# Patient Record
Sex: MALE | Race: WHITE | NOT HISPANIC OR LATINO | URBAN - METROPOLITAN AREA
[De-identification: names, ages, dates, MRNs, and addresses within clinical notes are randomized per-mention and may not be internally consistent; named-entity substitution may affect disease eponyms.]

---

## 2020-07-25 ENCOUNTER — TELEPHONE ENCOUNTER (OUTPATIENT)
Dept: URBAN - METROPOLITAN AREA CLINIC 13 | Facility: CLINIC | Age: 55
End: 2020-07-25

## 2020-07-25 RX ORDER — POLYETHYLENE GLYCOL 3350, SODIUM CHLORIDE, SODIUM BICARBONATE AND POTASSIUM CHLORIDE WITH LEMON FLAVOR 420; 11.2; 5.72; 1.48 G/4L; G/4L; G/4L; G/4L
TAKE 1/2 GALLON AT 5:00 PM DAY BEFORE PROCEDURE, TAKE SECOND 1/2 OF GALLON 6 HRS PRIOR TO PROCEDURE POWDER, FOR SOLUTION ORAL
Qty: 1 | Refills: 0 | OUTPATIENT
Start: 2017-03-02 | End: 2017-04-14

## 2020-07-26 ENCOUNTER — TELEPHONE ENCOUNTER (OUTPATIENT)
Dept: URBAN - METROPOLITAN AREA CLINIC 13 | Facility: CLINIC | Age: 55
End: 2020-07-26

## 2020-07-26 RX ORDER — CETIRIZINE HYDROCHLORIDE 10 MG/1
TAKE 1 CAPSULE DAILY CAPSULE, LIQUID FILLED ORAL
Refills: 0 | Status: ACTIVE | COMMUNITY
Start: 2017-03-02

## 2020-12-11 ENCOUNTER — OFFICE VISIT (OUTPATIENT)
Dept: URBAN - METROPOLITAN AREA CLINIC 113 | Facility: CLINIC | Age: 55
End: 2020-12-11

## 2020-12-14 ENCOUNTER — OFFICE VISIT (OUTPATIENT)
Dept: URBAN - METROPOLITAN AREA CLINIC 113 | Facility: CLINIC | Age: 55
End: 2020-12-14
Payer: COMMERCIAL

## 2020-12-14 VITALS
BODY MASS INDEX: 25.8 KG/M2 | HEART RATE: 94 BPM | WEIGHT: 201 LBS | DIASTOLIC BLOOD PRESSURE: 92 MMHG | SYSTOLIC BLOOD PRESSURE: 155 MMHG | TEMPERATURE: 96.9 F | HEIGHT: 74 IN | RESPIRATION RATE: 18 BRPM

## 2020-12-14 DIAGNOSIS — K64.8 INTERNAL HEMORRHOIDS: ICD-10-CM

## 2020-12-14 DIAGNOSIS — R68.89 THROAT CLEARING: ICD-10-CM

## 2020-12-14 PROCEDURE — 99203 OFFICE O/P NEW LOW 30 MIN: CPT | Performed by: PHYSICIAN ASSISTANT

## 2020-12-14 PROCEDURE — 3017F COLORECTAL CA SCREEN DOC REV: CPT | Performed by: PHYSICIAN ASSISTANT

## 2020-12-14 RX ORDER — HYDROCORTISONE ACETATE 25 MG/1
1 SUPPOSITORY SUPPOSITORY RECTAL
Qty: 30 SUPPOSITORIES | Refills: 2 | OUTPATIENT
Start: 2020-12-14 | End: 2021-03-14

## 2020-12-14 RX ORDER — AMLODIPINE BESYLATE 10 MG/1
1 TABLET TABLET ORAL ONCE A DAY
Status: ACTIVE | COMMUNITY

## 2020-12-14 NOTE — HPI-TODAY'S VISIT:
Mr. Han is a 55-year-old male presenting for evaluation of hemorrhoids. In late October, he was having difficulty with constipation and evacuation.  This has improved in the last couple weeks.  Around that time, he felt swollen internal hemorrhoids.  He used suppositories and this improved.  He follows a healthy diet, essentially plant based with some fish.  There was no blood in the stool.  There was very minimal pain, slight itching.  He currently is back to having a bowel movement once a day, which is more normal consistency.  There is a slight feeling of pressure. In October, he began having "silent reflux" symptoms.  He reports increased throat clearing, mucus production, which occurs for 30 minutes postprandially in the evening.  This resolves after taking Benadryl and Mucinex.  He denies any heartburn or reflux.

## 2021-03-15 ENCOUNTER — OFFICE VISIT (OUTPATIENT)
Dept: URBAN - METROPOLITAN AREA CLINIC 113 | Facility: CLINIC | Age: 56
End: 2021-03-15

## 2022-06-09 ENCOUNTER — WEB ENCOUNTER (OUTPATIENT)
Dept: URBAN - METROPOLITAN AREA CLINIC 113 | Facility: CLINIC | Age: 57
End: 2022-06-09

## 2022-06-13 ENCOUNTER — OFFICE VISIT (OUTPATIENT)
Dept: URBAN - METROPOLITAN AREA CLINIC 113 | Facility: CLINIC | Age: 57
End: 2022-06-13
Payer: COMMERCIAL

## 2022-06-13 VITALS
HEIGHT: 74 IN | HEART RATE: 68 BPM | WEIGHT: 197 LBS | SYSTOLIC BLOOD PRESSURE: 151 MMHG | BODY MASS INDEX: 25.28 KG/M2 | DIASTOLIC BLOOD PRESSURE: 98 MMHG | TEMPERATURE: 97.7 F

## 2022-06-13 DIAGNOSIS — K64.8 INTERNAL HEMORRHOIDS: ICD-10-CM

## 2022-06-13 DIAGNOSIS — K59.09 CHRONIC CONSTIPATION: ICD-10-CM

## 2022-06-13 PROBLEM — 12239004 THROAT CLEARING: Status: ACTIVE | Noted: 2020-12-14

## 2022-06-13 PROBLEM — 90458007 INTERNAL HEMORRHOIDS: Status: ACTIVE | Noted: 2020-12-14

## 2022-06-13 PROBLEM — 236069009: Status: ACTIVE | Noted: 2022-06-13

## 2022-06-13 PROCEDURE — 99214 OFFICE O/P EST MOD 30 MIN: CPT | Performed by: INTERNAL MEDICINE

## 2022-06-13 RX ORDER — POLYETHYLENE GLYCOL 3350 17 G/17G
AS DIRECTED POWDER, FOR SOLUTION ORAL ONCE A DAY
Qty: 30 | Refills: 3 | OUTPATIENT
Start: 2022-06-13 | End: 2022-10-11

## 2022-06-13 RX ORDER — PSYLLIUM SEED (WITH DEXTROSE)
1 PACKET WITH 8 OUNCES OF LIQUID AS NEEDED POWDER (GRAM) ORAL ONCE DAILY
Qty: 180 | Refills: 1 | OUTPATIENT
Start: 2022-06-13 | End: 2022-12-09

## 2022-06-13 RX ORDER — AMLODIPINE BESYLATE 10 MG/1
1 TABLET TABLET ORAL ONCE A DAY
Status: ACTIVE | COMMUNITY

## 2022-06-13 NOTE — HPI-TODAY'S VISIT:
Mr. Han is a 57-year-old male presenting for evaluation of constipation.  He has incomplete evacuation and straining.  He started Metamucil which has helped and increased his fiber intake.   He tries to drink a smoothie daily and puts his fiber in it.   He typically has a BM daily in the AM.  He will sometimes have the urge to have BMs throughout the day and sometimes has 4-5 BMs.   He has increased moisture which stains his underwear.  He otherwise denies abdominal pain, rectal bleeding, change in bowel habits, weight loss, nausea or vomiting.   He hasnt tried Miralax or other laxatives in the past.    Colonoscopy 4/14/2017 revealed a good prep, normal colon, large internal hemorrhoids.

## 2023-10-12 ENCOUNTER — TELEPHONE ENCOUNTER (OUTPATIENT)
Dept: URBAN - METROPOLITAN AREA CLINIC 113 | Facility: CLINIC | Age: 58
End: 2023-10-12

## 2023-10-13 ENCOUNTER — LAB OUTSIDE AN ENCOUNTER (OUTPATIENT)
Dept: URBAN - METROPOLITAN AREA CLINIC 113 | Facility: CLINIC | Age: 58
End: 2023-10-13

## 2023-10-13 LAB
A/G RATIO: 2.4
ABSOLUTE BASOPHILS: 50
ABSOLUTE EOSINOPHILS: 317
ABSOLUTE LYMPHOCYTES: 1627
ABSOLUTE MONOCYTES: 720
ABSOLUTE NEUTROPHILS: 4486
ALBUMIN: 4.7
ALKALINE PHOSPHATASE: 55
ALT (SGPT): 37
AST (SGOT): 23
BASOPHILS: 0.7
BILIRUBIN, TOTAL: 0.6
BUN/CREATININE RATIO: (no result)
BUN: 13
C-REACTIVE PROTEIN, QUANT: 1.4
CALCIUM: 9.2
CARBON DIOXIDE, TOTAL: 28
CHLORIDE: 104
CREATININE: 0.99
EGFR: 89
EOSINOPHILS: 4.4
GLOBULIN, TOTAL: 2
GLUCOSE: 90
HEMATOCRIT: 45.8
HEMOGLOBIN: 16
LYMPHOCYTES: 22.6
MCH: 31.7
MCHC: 34.9
MCV: 90.7
MONOCYTES: 10
MPV: 10.8
NEUTROPHILS: 62.3
PLATELET COUNT: 218
POTASSIUM: 4
PROTEIN, TOTAL: 6.7
RDW: 12.8
RED BLOOD CELL COUNT: 5.05
SODIUM: 140
WHITE BLOOD CELL COUNT: 7.2

## 2023-10-17 ENCOUNTER — TELEPHONE ENCOUNTER (OUTPATIENT)
Dept: URBAN - METROPOLITAN AREA CLINIC 113 | Facility: CLINIC | Age: 58
End: 2023-10-17

## 2023-10-19 LAB
CALPROTECTIN, FECAL: 139
CAMPYLOBACTER GROUP: (no result)
CLOSTRIDIUM DIFFICILE TOXINB,QL REAL TIME PCR: NOT DETECTED
CLOSTRIDIUM DIFFICILE: (no result)

## 2023-11-03 ENCOUNTER — OFFICE VISIT (OUTPATIENT)
Dept: URBAN - METROPOLITAN AREA CLINIC 113 | Facility: CLINIC | Age: 58
End: 2023-11-03
Payer: COMMERCIAL

## 2023-11-03 ENCOUNTER — DASHBOARD ENCOUNTERS (OUTPATIENT)
Age: 58
End: 2023-11-03

## 2023-11-03 VITALS
HEIGHT: 74 IN | BODY MASS INDEX: 25.28 KG/M2 | SYSTOLIC BLOOD PRESSURE: 143 MMHG | HEART RATE: 72 BPM | TEMPERATURE: 97.5 F | RESPIRATION RATE: 16 BRPM | DIASTOLIC BLOOD PRESSURE: 86 MMHG | WEIGHT: 197 LBS

## 2023-11-03 DIAGNOSIS — K64.8 INTERNAL HEMORRHOID, BLEEDING: ICD-10-CM

## 2023-11-03 DIAGNOSIS — R19.7 ACUTE DIARRHEA: ICD-10-CM

## 2023-11-03 DIAGNOSIS — K62.5 RECTAL BLEEDING: ICD-10-CM

## 2023-11-03 DIAGNOSIS — R10.31 RIGHT LOWER QUADRANT PAIN: ICD-10-CM

## 2023-11-03 PROBLEM — 409966000: Status: ACTIVE | Noted: 2023-11-03

## 2023-11-03 PROBLEM — 301716002: Status: ACTIVE | Noted: 2023-11-03

## 2023-11-03 PROBLEM — 75884004: Status: ACTIVE | Noted: 2023-11-03

## 2023-11-03 PROBLEM — 12063002: Status: ACTIVE | Noted: 2023-11-03

## 2023-11-03 PROBLEM — 54586004: Status: ACTIVE | Noted: 2023-11-03

## 2023-11-03 PROCEDURE — 99214 OFFICE O/P EST MOD 30 MIN: CPT | Performed by: INTERNAL MEDICINE

## 2023-11-03 RX ORDER — AMLODIPINE BESYLATE 10 MG/1
1 TABLET TABLET ORAL ONCE A DAY
Status: ACTIVE | COMMUNITY

## 2023-11-03 RX ORDER — DICYCLOMINE HYDROCHLORIDE 10 MG/1
1 CAPSULE 30 MINUTES BEFORE EATING CAPSULE ORAL
Qty: 45 | Refills: 1 | OUTPATIENT
Start: 2023-11-03 | End: 2024-05-01

## 2023-11-03 NOTE — HPI-TODAY'S VISIT:
Mr. Han is a 58-year-old male presenting for ER follow-up of diarrhea, abdominal pain and rectal bleeding.  Around October 9 he started developing increasing diarrhea, abdominal pain and rectal bleeding.  He was seen at [facility] ER on October 13 due to the severity of symptoms.  Labs and CT are below.  Stool studies were not obtained.  He was treated with antibiotics with Cipro and Flagyl for 20 days, but he was instructed to only do them for 7 to 10 days.  He was given dicyclomine as well for the abdominal cramping.  The dicyclomine helped immediately in controlling the abdominal pain and slowing down the diarrhea after 1 day of treatment.  He would like a prescription for this.  He is now having regular bowel movements with occasional constipation which is his baseline.  He is trying to eat more high-fiber foods and healthier foods.  He uses Imodium rarely now.  He denies fevers, chills, nausea, vomiting or blood in the stool.  He has known hemorrhoids which cause intermittent bleeding.  He was at a golf treatment prior to the symptoms starting here in [place].  There are also a few of his coworkers that were also sick.  No recent travel or recent antibiotic use.    Colonoscopy 4/14/2017 revealed a good prep, normal colon, large internal hemorrhoids. Labs 10/13/2023 BUN 15, creatinine 1.1, AST 25, ALT 45, alk phos 63, T. bili 0.9, albumin 4.8; WBC 9.5, hemoglobin 16.9, MCV 89, platelet 215CT abdomen pelvis with IV contrast 10/13/2023 revealed a small hiatal hernia, bibasilar atelectasis, normal liver, nonobstructing calculus in the right kidney measuring 5 mm, mild diffuse colonic wall thickening mainly at the transverse colon to the splenic flexure, mild atherosclerotic change of the aorta, prostate enlargement and chronic appearing defects at L5 and S1.